# Patient Record
Sex: MALE | Race: WHITE | NOT HISPANIC OR LATINO
[De-identification: names, ages, dates, MRNs, and addresses within clinical notes are randomized per-mention and may not be internally consistent; named-entity substitution may affect disease eponyms.]

---

## 2017-08-14 ENCOUNTER — TRANSCRIPTION ENCOUNTER (OUTPATIENT)
Age: 27
End: 2017-08-14

## 2024-06-04 ENCOUNTER — APPOINTMENT (OUTPATIENT)
Dept: ORTHOPEDIC SURGERY | Facility: CLINIC | Age: 34
End: 2024-06-04
Payer: COMMERCIAL

## 2024-06-04 VITALS — HEIGHT: 70 IN | BODY MASS INDEX: 25.77 KG/M2 | WEIGHT: 180 LBS

## 2024-06-04 DIAGNOSIS — M23.91 UNSPECIFIED INTERNAL DERANGEMENT OF RIGHT KNEE: ICD-10-CM

## 2024-06-04 DIAGNOSIS — M79.18 MYALGIA, OTHER SITE: ICD-10-CM

## 2024-06-04 PROBLEM — Z00.00 ENCOUNTER FOR PREVENTIVE HEALTH EXAMINATION: Status: ACTIVE | Noted: 2024-06-04

## 2024-06-04 PROCEDURE — 99204 OFFICE O/P NEW MOD 45 MIN: CPT

## 2024-06-04 PROCEDURE — 70030 X-RAY EYE FOR FOREIGN BODY: CPT | Mod: 50

## 2024-06-04 PROCEDURE — 73564 X-RAY EXAM KNEE 4 OR MORE: CPT | Mod: RT

## 2024-06-04 RX ORDER — NAPROXEN 500 MG/1
500 TABLET ORAL TWICE DAILY
Qty: 30 | Refills: 0 | Status: ACTIVE | COMMUNITY
Start: 2024-06-04 | End: 1900-01-01

## 2024-06-04 NOTE — HISTORY OF PRESENT ILLNESS
[de-identified] : 33 year old male  (, rec basketball, baseball) right knee pain since 02/2024 while playing basketball he was coming down from a rebound and felt his knee twist and buckle and pop. Felt another pop on 6/2/24 while playing baseball while throwing and knee buckled The pain is located medial and deep The pain is associated with swelling, buckling, instability Worse with activity and better at rest. Has tried HEP, ice, NSAIDs H/O ACL BTB and meniscus repair 2021 [facility] 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

## 2024-06-04 NOTE — IMAGING
[de-identified] :  RIGHT KNEE Inspection: moderate effusion Palpation: medial and lateral joint line tenderness Range of Motion: 3-125 degrees. Strength: Quadriceps strength is 4+/5 Hamstring strength is 5/5 Neurological: light touch is intact throughout  Ligament Stability and Special Tests:  McMurrays: positive Lachman: positive Pivot Shift: positive Posterior Drawer: neg Valgus: neg Varus: neg Patella Apprehension: neg Patella Maltracking: neg

## 2024-06-04 NOTE — ASSESSMENT
[FreeTextEntry1] : Right X-Ray Examination of the KNEE (4 views): there are no fractures, subluxations or dislocations. femoral button off later`al fmeur   knee internal derangement likely acl tear requiring surgery   Due to the patients acute complicated injury with instability along with pain, effusion, and pos lachman test on exam we will get an mri to eval for ACL tear   - We discussed their diagnosis and treatment options at length including the risks and benefits of both surgical and non-surgical options. Surgical risks include but are not limited to pain, infection, bleeding, vascular injury, numbness, tingling, nerve damage. - The patient was advised to apply ice (wrapped in a towel or protective covering) to the area daily (20 minutes at a time, 2-4X/day). - naprosyn rx - Patient was given a prescription for an anti-inflammatory medication.  They will take it for the next week and then on an as needed basis, as long as there are no medical contra-indications.  Patient is counseled on possible GI, renal, and cardiovascular side effects. - The patient was advised to modify their activities. - f/u after mri to further discuss surgery

## 2024-06-10 ENCOUNTER — APPOINTMENT (OUTPATIENT)
Dept: MRI IMAGING | Facility: CLINIC | Age: 34
End: 2024-06-10
Payer: COMMERCIAL

## 2024-06-10 PROCEDURE — 73721 MRI JNT OF LWR EXTRE W/O DYE: CPT | Mod: RT

## 2024-06-12 PROBLEM — S83.231A COMPLEX TEAR OF MEDIAL MENISCUS OF RIGHT KNEE AS CURRENT INJURY, INITIAL ENCOUNTER: Status: ACTIVE | Noted: 2024-06-12

## 2024-06-12 PROBLEM — M71.21 SYNOVIAL CYST OF RIGHT KNEE: Status: ACTIVE | Noted: 2024-06-12

## 2024-06-12 PROBLEM — M65.9 SYNOVITIS OF KNEE: Status: ACTIVE | Noted: 2024-06-12

## 2024-06-13 ENCOUNTER — APPOINTMENT (OUTPATIENT)
Dept: ORTHOPEDIC SURGERY | Facility: CLINIC | Age: 34
End: 2024-06-13
Payer: COMMERCIAL

## 2024-06-13 DIAGNOSIS — M65.9 SYNOVITIS AND TENOSYNOVITIS, UNSPECIFIED: ICD-10-CM

## 2024-06-13 DIAGNOSIS — M71.21 SYNOVIAL CYST OF POPLITEAL SPACE [BAKER], RIGHT KNEE: ICD-10-CM

## 2024-06-13 DIAGNOSIS — S83.231A COMPLEX TEAR OF MEDIAL MENISCUS, CURRENT INJURY, RIGHT KNEE, INITIAL ENCOUNTER: ICD-10-CM

## 2024-06-13 PROCEDURE — 99214 OFFICE O/P EST MOD 30 MIN: CPT

## 2024-06-13 NOTE — IMAGING
[de-identified] : RIGHT KNEE Inspection: moderate effusion Palpation: medial and lateral joint line tenderness Range of Motion: 5-125 degrees. Strength: Quadriceps strength is 4+/5 Hamstring strength is 5/5 Neurological: light touch is intact throughout  Ligament Stability and Special Tests:  McMurrays: positive Lachman: neg Pivot Shift: neg Posterior Drawer: neg Valgus: neg Varus: neg Patella Apprehension: neg Patella Maltracking: neg

## 2024-06-13 NOTE — ASSESSMENT
[FreeTextEntry1] :  Imaging was reviewed and independently interpreted mri right knee 6/10/24 - prior acl graft intact with well corticated bone fragment within, complex MMT with displaced flap below the body into gutter, med parameniscal cyst, synov, some med deg changes   - We discussed their diagnosis and treatment options at length including the risks and benefits of both surgical and non-surgical options..  - Given their active lifestyle along with pain and locked knee and mechanical symptoms they are a surgical candidate. - The risks, benefits, and alternatives to right knee PMM vs repair, synov, cyst excision were discussed with the patient, all questions were answered.

## 2024-06-13 NOTE — HISTORY OF PRESENT ILLNESS
[de-identified] : 33 year old male  (, rec basketball, baseball) right knee pain since 02/2024 while playing basketball he was coming down from a rebound and felt his knee twist and buckle and pop. Felt another pop on 6/2/24 while playing baseball while throwing and knee buckled The pain is located medial and deep The pain is associated with swelling, buckling, instability, catching Worse with activity and better at rest. Has tried HEP, ice, NSAIDs H/O ACL BTB and meniscus repair 2021 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

## 2024-06-13 NOTE — DISCUSSION/SUMMARY
[de-identified] : The patient was advised of the diagnosis.  The natural history of the pathology was explained in full to the patient in layman's terms.  Several different treatment options were discussed and explained to the patient including the risks and benefits of both surgical and non-surgical treatments.  The risks, benefits, and alternatives to surgical arthroscopy of the right knee with partial medial meniscectomy vs repair, synovectomy, and cyst excision were reviewed with the patient.  Specifically, I reviewed with the patient that any anterior knee pain may paradoxically worsen for the first six weeks following arthroscopy due to quadriceps weakness. Further, I reviewed with the patient that while arthroscopic treatment typically provides substantial relief of the symptoms (posteromedial or posterolateral joint line pain and mechanical symptoms) related to meniscus tears, arthroscopic treatments typically have very minimal relief of symptoms (anterior knee pain) related to chondromalacia patella or early osteoarthritis.  The risk of recurrent tears as well as progression of occult or underlying arthritis, avascular necrosis, and / or chondrolysis were discussed as well. The patient clearly communicated that these issues were understood.    We also discussed that the risks of surgery include but are not limited to pain, infection (superficial or deep), bleeding, vascular injury, numbness, tingling, nerve damage (direct or indirect), scarring, wound breakdown, failure to resolve symptoms, symptom recurrence, the need for further surgery as well as medical complications such as blood clots, pulmonary embolism, heart attack, stroke, and other anesthesia complications including even death.  The patient clearly communicated that these risks were understood and wished to proceed. This will be scheduled accordingly.

## 2024-06-17 ENCOUNTER — NON-APPOINTMENT (OUTPATIENT)
Age: 34
End: 2024-06-17

## 2024-07-12 ENCOUNTER — APPOINTMENT (OUTPATIENT)
Age: 34
End: 2024-07-12
Payer: COMMERCIAL

## 2024-07-12 PROCEDURE — 29875 ARTHRS KNEE SURG SYNVCT LMTD: CPT | Mod: AS,RT

## 2024-07-12 PROCEDURE — 27347 REMOVE KNEE CYST: CPT | Mod: AS

## 2024-07-12 PROCEDURE — 29881 ARTHRS KNE SRG MNISECTMY M/L: CPT | Mod: AS,RT

## 2024-07-12 PROCEDURE — 29881 ARTHRS KNE SRG MNISECTMY M/L: CPT | Mod: RT

## 2024-07-12 PROCEDURE — 29875 ARTHRS KNEE SURG SYNVCT LMTD: CPT | Mod: RT

## 2024-07-12 PROCEDURE — 27347 REMOVE KNEE CYST: CPT

## 2024-07-12 RX ORDER — ACETAMINOPHEN 500 MG/1
500 TABLET ORAL 3 TIMES DAILY
Qty: 90 | Refills: 0 | Status: ACTIVE | COMMUNITY
Start: 2024-07-12 | End: 1900-01-01

## 2024-07-12 RX ORDER — PROMETHAZINE HYDROCHLORIDE 12.5 MG/1
12.5 TABLET ORAL EVERY 6 HOURS
Qty: 20 | Refills: 0 | Status: ACTIVE | COMMUNITY
Start: 2024-07-12 | End: 1900-01-01

## 2024-07-12 RX ORDER — OXYCODONE 5 MG/1
5 TABLET ORAL EVERY 6 HOURS
Qty: 10 | Refills: 0 | Status: ACTIVE | COMMUNITY
Start: 2024-07-12 | End: 1900-01-01

## 2024-07-12 RX ORDER — IBUPROFEN 600 MG/1
600 TABLET ORAL EVERY 6 HOURS
Qty: 20 | Refills: 0 | Status: ACTIVE | COMMUNITY
Start: 2024-07-12 | End: 1900-01-01

## 2024-07-17 PROBLEM — S83.231D COMPLEX TEAR OF MEDIAL MENISCUS OF RIGHT KNEE AS CURRENT INJURY, SUBSEQUENT ENCOUNTER: Status: ACTIVE | Noted: 2024-07-17

## 2024-07-17 PROBLEM — S83.231A COMPLEX TEAR OF MEDIAL MENISCUS OF RIGHT KNEE AS CURRENT INJURY, INITIAL ENCOUNTER: Status: RESOLVED | Noted: 2024-06-12 | Resolved: 2024-07-17

## 2024-07-18 ENCOUNTER — APPOINTMENT (OUTPATIENT)
Dept: ORTHOPEDIC SURGERY | Facility: CLINIC | Age: 34
End: 2024-07-18
Payer: COMMERCIAL

## 2024-07-18 DIAGNOSIS — S83.231D COMPLEX TEAR OF MEDIAL MENISCUS, CURRENT INJURY, RIGHT KNEE, SUBSEQUENT ENCOUNTER: ICD-10-CM

## 2024-07-18 DIAGNOSIS — S83.231A COMPLEX TEAR OF MEDIAL MENISCUS, CURRENT INJURY, RIGHT KNEE, INITIAL ENCOUNTER: ICD-10-CM

## 2024-07-18 PROCEDURE — 99024 POSTOP FOLLOW-UP VISIT: CPT

## 2024-09-04 ENCOUNTER — NON-APPOINTMENT (OUTPATIENT)
Age: 34
End: 2024-09-04

## 2024-09-19 ENCOUNTER — APPOINTMENT (OUTPATIENT)
Dept: ORTHOPEDIC SURGERY | Facility: CLINIC | Age: 34
End: 2024-09-19

## 2024-09-19 DIAGNOSIS — S83.231D COMPLEX TEAR OF MEDIAL MENISCUS, CURRENT INJURY, RIGHT KNEE, SUBSEQUENT ENCOUNTER: ICD-10-CM

## 2024-09-19 NOTE — HISTORY OF PRESENT ILLNESS
[de-identified] : 34 year old male  (, rec basketball, baseball) right knee pain since 02/2024 while playing basketball he was coming down from a rebound and felt his knee twist and buckle and pop. Felt another pop on 6/2/24 while playing baseball while throwing and knee buckled The pain is located medial and deep The pain is associated with swelling, buckling, instability, catching Worse with activity and better at rest. Has tried HEP, ice, NSAIDs H/O ACL BTB and meniscus repair 2021 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

## 2024-09-19 NOTE — IMAGING
[de-identified] :  RIGHT  KNEE Inspection:  well healed surg scars, mild effusion Palpation: no ttp Knee Range of Motion:  0-135 Strength: 5-/5 Quadriceps strength, 5/5 Hamstring strength Neurological: light touch is intact throughout Ligament Stability and Special Tests:  McMurrays: neg Lachman: neg Pivot Shift: neg Posterior Drawer: neg Valgus: neg Varus: neg Patella Apprehension: neg Patella Maltracking: neg

## 2024-09-19 NOTE — ASSESSMENT
[FreeTextEntry1] :  s/p R knee PMM, synov, cyst on 7/12/24  - cont PT on post op protocol - The patient was provided with a prescription for Physical Therapy  - Home exercises program learned at physical therapy. - The patient was advised to apply ice (wrapped in a towel or protective covering) to the area daily (20 minutes at a time, 2-4X/day). - The patient was advised to let pain guide the gradual advancement of activities.